# Patient Record
Sex: MALE | Race: BLACK OR AFRICAN AMERICAN
[De-identification: names, ages, dates, MRNs, and addresses within clinical notes are randomized per-mention and may not be internally consistent; named-entity substitution may affect disease eponyms.]

---

## 2019-08-04 NOTE — EMERGENCY ROOM REPORT
History of Present Illness


General


Chief Complaint:  Pain


Source:  Patient





Present Illness


HPI


Patient is a 23-year-old male presenting for right ankle pain.  He was seen in 

this emergency department 4 years prior for right ankle fracture.  He was 

treated and told to follow-up with orthopedics for further evaluation.  He 

states that he was seen by orthopedics and surgery was recommended.  He 

declined surgery and a cast was placed.  He states that pain has been 

continuous since the injury and is now a 7 out of 10 dull ache primarily to the 

medial side.  Worse with movement such as walking.  Denies any subsequent 

injury.  He denies any other symptoms including numbness, tingling, rash, fever

, chills


Allergies:  


Coded Allergies:  


     No Known Allergies (Unverified , 10/10/12)





Patient History


Past Medical History:  see triage record


Pertinent Family History:  none


Reviewed Nursing Documentation:  PMH: Agreed; PSxH: Agreed





Nursing Documentation-PMH


Hx Cardiac Problems:  No


Hx Hypertension:  No


Hx Pacemaker:  No


Hx Asthma:  No


Hx COPD:  No


Hx Diabetes:  No


Hx Cancer:  No


Hx Gastrointestinal Problems:  No


Hx Dialysis:  No


Hx Neurological Problems:  No


Hx Cerebrovascular Accident:  No


Hx Seizures:  No





Review of Systems


All Other Systems:  negative except mentioned in HPI





Physical Exam





Vital Signs








  Date Time  Temp Pulse Resp B/P (MAP) Pulse Ox O2 Delivery O2 Flow Rate FiO2


 


8/4/19 13:50 98.4 100 16 109/70 (83) 95 Room Air  








Sp02 EP Interpretation:  reviewed, normal


General Appearance:  no apparent distress, alert, GCS 15, non-toxic


Head:  normocephalic, atraumatic


Musculoskeletal:  normal range of motion, no calf tenderness, tender - R medial 

ankle


Neurologic:  alert, oriented x3, responsive, motor strength/tone normal, 

sensory intact, speech normal


Psychiatric:  judgement/insight normal, memory normal, mood/affect normal, no 

suicidal/homicidal ideation


Skin:  no rash, warm/dry





Medical Decision Making


PA Attestation


Dr. Barrera is my supervising physician. Patient management was discussed 

with my supervising physician


Diagnostic Impression:  


 Primary Impression:  


 Ankle pain, right


 Qualified Codes:  M25.571 - Pain in right ankle and joints of right foot; 

G89.29 - Other chronic pain


ER Course


Patient is a 23-year-old male with a previous right bimalleolar fracture 

presenting for continued right ankle pain.





Ddx considered include but not limited to sprain/strain, fracture, contusion





PE: Vitals stable. NAD


Right ankle: No obvious deformity.  Sensation is intact to light touch.  There 

is tenderness to palpation over the medial malleolus.  No ecchymosis.  No edema





R ankle xray shows old bi-mal fracture. No acute changes. 





RICE instructed. Motrin prescribed. 





F/U with PCP and ortho as discussed. ER precautions given


Other X-Ray Diagnostic Results


Other X-Ray Diagnostic Results :  


   X-Ray ordered:  R ankle


   # of Views/Limited Vs Complete:  3 View, Complete


   Indication:  Pain


   EP Interpretation:  Yes


   PA Xray:  Interpretation reviewed, by supervising MD, and agrees with 

findings.


   Interpretation:  no dislocation, no soft tissue swelling, no fractures


   Impression:  No acute disease


   Electronically Signed by:  Paresh Gonzalez PA-C





Last Vital Signs








  Date Time  Temp Pulse Resp B/P (MAP) Pulse Ox O2 Delivery O2 Flow Rate FiO2


 


8/4/19 14:50 98.1 86 16 124/79 100 Room Air  








Status:  improved


Disposition:  HOME, SELF-CARE


Condition:  Improved


Scripts


Ibuprofen* (MOTRIN*) 600 Mg Tablet


600 MG ORAL Q8H PRN for For Pain, #30 TAB 0 Refills


   Prov: PARESH GONZALEZ         8/4/19


Referrals:  


[facility]





[facility]  **Open 24 hour /7 days a week by Appointment Only**





[address] [address]


Patient Instructions:  Ankle Pain





Additional Instructions:  


I discussed my findings with the patient. All questions and concerns have been 

answered. Treatment and medication compliance have been addressed. I advised 

the patient that they need to follow up with PMD in 3-5 days. Return to ER if 

pain remains or worsens, numbness or tingling occurs, new rash is noticed, 

fever is noticed, or if needed for any reason. Patient verbalized understanding 

of discharge instructions.











PARESH GONZALEZ Aug 4, 2019 16:13

## 2019-08-04 NOTE — NUR
ER DISCHARGE NOTE:



Patient is cleared to be discharged per ERPA, pt is aox4, on room air, with 
stable vital signs. pt was given dc and prescription instructions, pt was able 
to verbalize understanding, pt id band removed. pt is able to ambulate with 
steady gait. pt took all belongings.

## 2019-08-04 NOTE — NUR
ED Nurse Note:



Patient ambulated in to ER from home due to Rt ankle pain 7/10. Patient is 
alert and oriented x 4 and ambulatory. Skin clean and intact. Calm and 
cooperative. No acute distress noted at this time.

## 2019-08-05 NOTE — DIAGNOSTIC IMAGING REPORT
Indication: Pain right ankle  

 

Comparison: None

 

Findings:

 

3 views of the right ankle obtained.

 

 No acute fracture is identified. There is an old ununited fracture of the medial

malleolus. There is a healed fracture of the distal fibula. No malalignment

identified. Soft tissues are unremarkable..

 

Impression:

 

No acute fracture identified. Evidence of old fractures

## 2019-12-06 ENCOUNTER — HOSPITAL ENCOUNTER (EMERGENCY)
Dept: HOSPITAL 72 - EMR | Age: 24
Discharge: HOME | End: 2019-12-06
Payer: MEDICAID

## 2019-12-06 VITALS — WEIGHT: 140 LBS | BODY MASS INDEX: 21.22 KG/M2 | HEIGHT: 68 IN

## 2019-12-06 VITALS — DIASTOLIC BLOOD PRESSURE: 67 MMHG | SYSTOLIC BLOOD PRESSURE: 115 MMHG

## 2019-12-06 DIAGNOSIS — J45.909: ICD-10-CM

## 2019-12-06 DIAGNOSIS — S61.253A: Primary | ICD-10-CM
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
## 2019-12-06 PROCEDURE — 99283 EMERGENCY DEPT VISIT LOW MDM: CPT

## 2019-12-06 PROCEDURE — 29130 APPL FINGER SPLINT STATIC: CPT

## 2019-12-06 PROCEDURE — 73140 X-RAY EXAM OF FINGER(S): CPT

## 2019-12-06 PROCEDURE — 90471 IMMUNIZATION ADMIN: CPT

## 2019-12-06 PROCEDURE — 90715 TDAP VACCINE 7 YRS/> IM: CPT

## 2019-12-06 NOTE — EMERGENCY ROOM REPORT
History of Present Illness


General


Chief Complaint:  Animal Bite


Source:  Patient


 (Yamile Rowley)





Present Illness


HPI


24-year-old male with no significant past medical history here complaining of a 

dog bite on left middle finger that happened this morning.  Patient reports 

that he was walking as he got attacked by a pit bull.  Rating the pain 10 out 

of 10, reporting that has not taken medication for symptom relief.  Denies 

tingling and numbness.  Denies pain radiation.  Reports that he is unable to 

bend his finger.  Denies all other injuries, is not up-to-date with tetanus 

shot.  No signs of penetration noted.


 (Yamile Rowley)


Allergies:  


Coded Allergies:  


     No Known Allergies (Unverified , 10/10/12)





Patient History


Past Medical History:  see triage record


Past Surgical History:  unable to obtain


Pertinent Family History:  none


Immunizations:  other - tdap given today


Reviewed Nursing Documentation:  PMH: Agreed; PSxH: Agreed (Yamile Rowley)





Nursing Documentation-PMH


Past Medical History:  No History, Except For


Hx Cardiac Problems:  No


Hx Hypertension:  No


Hx Pacemaker:  No


Hx Asthma:  Yes


Hx COPD:  No


Hx Diabetes:  No


Hx Cancer:  No


Hx Gastrointestinal Problems:  No


Hx Dialysis:  No


Hx Neurological Problems:  No


Hx Cerebrovascular Accident:  No


Hx Seizures:  No


 (Yamile Rowley)





Review of Systems


All Other Systems:  negative except mentioned in HPI


 (Yamile Rowley)





Physical Exam





Vital Signs








  Date Time  Temp Pulse Resp B/P (MAP) Pulse Ox O2 Delivery O2 Flow Rate FiO2


 


12/6/19 17:53 97.5 88 16 115/67 (83) 94 Room Air  








Sp02 EP Interpretation:  reviewed, normal


General Appearance:  no apparent distress, alert, GCS 15, non-toxic


Head:  normocephalic, atraumatic


Eyes:  bilateral eye normal inspection, bilateral eye PERRL


ENT:  hearing grossly normal, normal pharynx, no angioedema, normal voice


Neck:  full range of motion, supple/symm/no masses


Respiratory:  chest non-tender, lungs clear, normal breath sounds, no rhonchi, 

no wheezing, speaking full sentences


Cardiovascular #1:  regular rate, rhythm, no edema, no murmur, normal capillary 

refill


Cardiovascular #2:  2+ radial (R), 2+ radial (L)


Gastrointestinal:  normal bowel sounds, non tender, soft, non-distended, no 

guarding, no rebound


Rectal:  deferred


Genitourinary:  no CVA tenderness


Musculoskeletal:  back normal, normal range of motion, swelling - left middle 

finger with superficial bite


Neurologic:  alert, motor strength/tone normal, oriented x3, sensory intact, 

responsive, speech normal


Psychiatric:  judgement/insight normal, memory normal, mood/affect normal, no 

suicidal/homicidal ideation


Skin:  rash - dog bite, superficial on left middle finger


Lymphatic:  normal inspection, no adenopathy


 (Yamile Rowley)





Procedures


Splinting


Splinting :  


   Consent:  Verbal


   Location:  left middle finger


   Pre-Made Type:  metal


   Pre-Proc Neuro Vasc Exam:  normal


   Post-Proc Neuro Vasc Exam:  normal


   Patient Tolerated:  Well


   Complications:  None


 (Yamile Rowley)





Medical Decision Making


PA Attestation


All diagnoses and treatment plans were reviewed and discussed with my 

supervising physician Dr. Mclaughlin


 (Yamile Rowley)


Diagnostic Impression:  


 Primary Impression:  


 Dog bite of finger


ER Course


24-year-old male with no significant past medical history here complaining of a 

dog bite on left middle finger that happened this morning.  Patient reports 

that he was walking as he got attacked by a pit bull.  Rating the pain 10 out 

of 10, reporting that has not taken medication for symptom relief.  Denies 

tingling and numbness.  Denies pain radiation.  Reports that he is unable to 

bend his finger.  Denies all other injuries, is not up-to-date with tetanus 

shot.  No signs of penetration noted.  Patient has no motor or neurological 

deficits and no sensory deficits





Ddx considered but are not limited to : Cellulitis, penetrating trauma, 

superficial dog bite without foreign body, dog bite with foreign body


Vital signs: are WNL, pt. is afebrile





H&PE are most consistent with: Superficial dog bite without foreign body





ORDERS: Finger x-ray, Augmentin, ibuprofen


ED INTERVENTIONS: Wound clean and dressed, Tdap, metal splint


DISCHARGE: At this time pt. is stable for d/c to home. Will provide printed 

patient care instructions, and any necessary prescriptions. Care plan and 

follow up instructions have been discussed with the patient prior to discharge.

  Patient to follow-up with her primary care provider, if worsening symptoms 

return to emergency room


 (Yamile Rowley)





Other X-Ray Diagnostic Results


Other X-Ray Diagnostic Results :  


   X-Ray ordered:  Left hand


   # of Views/Limited Vs Complete:  3 View


   Indication:  Pain


   EP Interpretation:  Yes


   PA Xray:  Interpretation reviewed, by supervising MD, and agrees with 

findings.


   Interpretation:  no dislocation, no soft tissue swelling, no fractures, 

other - No foreign body noted


   Impression:  No acute disease


   Electronically Signed by:  Yamile Farrell PA-C


 (Yamile Rowley)


Other X-Ray Diagnostic Results :  


   Electronically Signed by:  P A documentation of Xray reviewed by me and is 

accurate, Devante Mclaughlin MD


 (Devante Mclaughlin MD)





Last Vital Signs








  Date Time  Temp Pulse Resp B/P (MAP) Pulse Ox O2 Delivery O2 Flow Rate FiO2


 


12/6/19 17:53 97.5 88 16 115/67 (83) 94 Room Air  








 (Yamile Rowley)


Disposition:  HOME, SELF-CARE


Condition:  Stable


Scripts


Acetaminophen* (ACETAMINOPHEN 325MG TABLET*) 325 Mg Tablet


650 MG ORAL Q6H PRN for For Pain, #30 TAB


   Prov: Yamile Rowley         12/6/19 


Amoxicillin/Potassium Clav 875-125* (AUGMENTIN 875-125 TABLET*) 1 Each Tablet


1 TAB ORAL TWICE A DAY for 10 Days, #20 TAB


   Prov: Yamile Rowely         12/6/19


Patient Instructions:  Animal Bite





Additional Instructions:  


Take medication as directed, follow-up with your primary care provider, if 

worsening symptoms return to the emergency room.











Yamile Rowley Dec 6, 2019 18:17


Devante Mclaughlin MD Dec 7, 2019 06:25

## 2019-12-06 NOTE — DIAGNOSTIC IMAGING REPORT
EXAM:

  XR Left Fingers, 2 or More Views

 

CLINICAL HISTORY:

  FB

 

TECHNIQUE:

  Frontal, lateral and oblique views of the fingers of the left hand.

 

COMPARISON:

  No relevant prior studies available.

 

FINDINGS:

  Bones/joints:  No acute displaced fracture or dislocation.

  Soft tissues:  No radiopaque foreign body.

 

IMPRESSION:     

  No radiopaque foreign body.
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
## 2019-12-06 NOTE — NUR
ER DISCHARGE NOTE:

Patient is cleared to be discharged per ERMD. Patient is awake, alert, oriented 
x 4.  D/C and prescription given. Patient verbalized understanding of it.  ID 
band removed. Ambulated out with steady gait with all his belongingns. EMT 
applied finger splint/dressing on the left middle finger. Clean,dry dressing 
noted with cap refill < 3 sec, + sensation.
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
## 2020-11-11 ENCOUNTER — HOSPITAL ENCOUNTER (EMERGENCY)
Dept: HOSPITAL 72 - EMR | Age: 25
Discharge: HOME | End: 2020-11-11
Payer: MEDICAID

## 2020-11-11 VITALS — HEIGHT: 71 IN | WEIGHT: 160 LBS | BODY MASS INDEX: 22.4 KG/M2

## 2020-11-11 VITALS — SYSTOLIC BLOOD PRESSURE: 133 MMHG | DIASTOLIC BLOOD PRESSURE: 72 MMHG

## 2020-11-11 VITALS — DIASTOLIC BLOOD PRESSURE: 86 MMHG | SYSTOLIC BLOOD PRESSURE: 144 MMHG

## 2020-11-11 DIAGNOSIS — R10.13: Primary | ICD-10-CM

## 2020-11-11 DIAGNOSIS — J45.909: ICD-10-CM
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
## 2020-11-11 DIAGNOSIS — S80.02XA: ICD-10-CM
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
## 2020-11-11 PROCEDURE — 99283 EMERGENCY DEPT VISIT LOW MDM: CPT

## 2020-11-11 PROCEDURE — 73562 X-RAY EXAM OF KNEE 3: CPT
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
## 2020-11-12 NOTE — DIAGNOSTIC IMAGING REPORT
Indication: Left knee pain

 

Technique: 3 views of the left knee

 

Comparison: None

 

Findings: There is some prepatellar soft tissue swelling. No definite joint effusion.

No acute fracture. No dislocations. Joint spaces are preserved. No radiopaque foreign

body

 

Impression: No acute bony trauma